# Patient Record
Sex: MALE | Race: WHITE
[De-identification: names, ages, dates, MRNs, and addresses within clinical notes are randomized per-mention and may not be internally consistent; named-entity substitution may affect disease eponyms.]

---

## 2020-10-24 ENCOUNTER — HOSPITAL ENCOUNTER (EMERGENCY)
Dept: HOSPITAL 39 - ER | Age: 30
Discharge: HOME | End: 2020-10-24
Payer: COMMERCIAL

## 2020-10-24 VITALS — DIASTOLIC BLOOD PRESSURE: 98 MMHG | SYSTOLIC BLOOD PRESSURE: 146 MMHG

## 2020-10-24 VITALS — OXYGEN SATURATION: 98 %

## 2020-10-24 VITALS — TEMPERATURE: 98.7 F

## 2020-10-24 DIAGNOSIS — Z20.828: ICD-10-CM

## 2020-10-24 DIAGNOSIS — J06.9: ICD-10-CM

## 2020-10-24 DIAGNOSIS — U07.1: Primary | ICD-10-CM

## 2020-10-24 NOTE — ED.PDOC
History of Present Illness





- General


Chief Complaint: General


Stated Complaint: bodyaches, sore throat


Time Seen by Provider: 10/24/20 20:56


Source: patient, RN notes reviewed, Vital Signs reviewed


Exam Limitations: no limitations





- History of Present Illness


Initial Comments: 





This is a 30-year-old healthy male presenting to the emergency department with 3

days of body aches, malaise, sore throat as well as anosmia and dysgeusia that 

began yesterday.  He denies any fevers. He does report "tightness in his chest 

when taking a deep breath ", but denies any chest pain.  He denies any shortness

of breath  He does report some associated chills.  No vomiting, diarrhea.  He 

denies any Covid contacts.  No recent traveling.  He does work at [employer].


Allergies/Adverse Reactions: 


Allergies





salmon Allergy (Uncoded 10/24/20 21:11)


   





Home Medications: 


Ambulatory Orders





Albuterol Inhaler [Ventolin Hfa Inhaler] 2 - 4 puff INH Q4H PRN #1 inh 10/24/20 


Benzonatate Perles [Tessalon Perles] 100 mg PO Q6HR PRN #20 cap 10/24/20 


Ondansetron Odt [Zofran ODT] 8 mg PO Q6H PRN #12 tab 10/24/20 











Review of Systems





- Review of Systems


Constitutional: States: chills.  Denies: fever


EENTM: States: nose congestion, throat pain, other - Anosmia and dysgeusia


Respiratory: States: cough, other.  Denies: short of breath, wheezing


Cardiology: Denies: chest pain, edema


Gastrointestinal/Abdominal: Denies: diarrhea, nausea


Genitourinary: Denies: dysuria, hematuria


Musculoskeletal: Denies: back pain, joint pain, muscle pain, neck pain


Skin: States: no symptoms reported


Neurological: States: no symptoms reported





Physical Exam





- Physical Exam


General Appearance: Alert, Comfortable, No apparent distress, Well Developed, 

Well Groomed, Well Hydrated, Well Nourished


Ears, Nose, Throat: hearing grossly normal, normal ENT inspection


Neck: non-tender, full range of motion, supple


Respiratory: chest non-tender, lungs clear, normal breath sounds, no respiratory

distress, no accessory muscle use


Cardiovascular/Chest: normal peripheral pulses, regular rate, rhythm, no edema, 

no gallop, no JVD, no murmur


Gastrointestinal/Abdominal: non tender, soft, no organomegaly


Extremity: normal range of motion, non-tender, normal inspection


Neurologic: no motor/sensory deficits, alert, normal mood/affect, oriented x 3


Skin Exam: normal color, warm/dry





Progress





- Progress


Progress: 





10/24/20 21:15


Patient reports "tightness in his chest when taking a deep breath".I offered a 

chest x-ray, patient refused.  Discussed plan for discharge home.  Recommended 

quarantine at home until Covid results are back.  If test is positive, 

recommended 14-day quarantine or return to work according to his employers 

return to work policy.  If test is negative, he will also need to follow-up with

his employer regarding return to work instructions.  Strict warnings given to 

return the emergency room for shortness of breath, vomiting, changes in mental 

status, or any other concerns.  Stressed importance of facemask and hand 

hygiene.





DDx: COVID-19 versus other viral URI





MDM:


Well-appearing, well-hydrated, no respiratory distress.  Symptoms certainly 

concerning for COVID-19 infection.  O2 sats normal.  No vomiting.  Tolerating 

p.o.  No occasion for admission or extensive lab work-up.  Viral panel sent and 

pending.  Covid instructions and ER warnings were given











Delgado Mejía DO


[id number]








Departure





- Departure


Clinical Impression: 


 Viral upper respiratory tract infection with cough, Suspected COVID-19 virus 

infection





Time of Disposition: 21:01


Disposition: Discharge to Home or Self Care


Condition: Good


Departure Forms:  ED Discharge - Pt. Copy, Patient Portal Self Enrollment


Instructions:  Coronavirus Disease 2019 (COVID-19)


Diet: resume usual diet


Activity: increase activity as tolerated


Prescriptions: 


Benzonatate Perles [Tessalon Perles] 100 mg PO Q6HR PRN #20 cap


 PRN Reason: Cough


Albuterol Inhaler [Ventolin Hfa Inhaler] 2 - 4 puff INH Q4H PRN #1 inh


 PRN Reason: Shortness Of Breath


Ondansetron Odt [Zofran ODT] 8 mg PO Q6H PRN #12 tab


 PRN Reason: Nausea


Home Medications: 


Ambulatory Orders





Albuterol Inhaler [Ventolin Hfa Inhaler] 2 - 4 puff INH Q4H PRN #1 inh 10/24/20 


Benzonatate Perles [Tessalon Perles] 100 mg PO Q6HR PRN #20 cap 10/24/20 


Ondansetron Odt [Zofran ODT] 8 mg PO Q6H PRN #12 tab 10/24/20